# Patient Record
Sex: FEMALE | Race: WHITE | NOT HISPANIC OR LATINO
[De-identification: names, ages, dates, MRNs, and addresses within clinical notes are randomized per-mention and may not be internally consistent; named-entity substitution may affect disease eponyms.]

---

## 2017-06-30 ENCOUNTER — APPOINTMENT (OUTPATIENT)
Dept: COLORECTAL SURGERY | Facility: CLINIC | Age: 52
End: 2017-06-30

## 2017-06-30 VITALS
HEIGHT: 64 IN | WEIGHT: 172 LBS | BODY MASS INDEX: 29.37 KG/M2 | HEART RATE: 83 BPM | SYSTOLIC BLOOD PRESSURE: 122 MMHG | TEMPERATURE: 97.6 F | DIASTOLIC BLOOD PRESSURE: 74 MMHG | RESPIRATION RATE: 16 BRPM

## 2017-11-03 ENCOUNTER — MESSAGE (OUTPATIENT)
Age: 52
End: 2017-11-03

## 2017-11-03 ENCOUNTER — OTHER (OUTPATIENT)
Age: 52
End: 2017-11-03

## 2017-11-17 ENCOUNTER — APPOINTMENT (OUTPATIENT)
Dept: COLORECTAL SURGERY | Facility: CLINIC | Age: 52
End: 2017-11-17
Payer: COMMERCIAL

## 2017-11-17 VITALS
HEART RATE: 82 BPM | HEIGHT: 64 IN | SYSTOLIC BLOOD PRESSURE: 110 MMHG | BODY MASS INDEX: 29.37 KG/M2 | TEMPERATURE: 98.1 F | DIASTOLIC BLOOD PRESSURE: 72 MMHG | WEIGHT: 172 LBS

## 2017-11-17 DIAGNOSIS — K66.0 PERITONEAL ADHESIONS (POSTPROCEDURAL) (POSTINFECTION): ICD-10-CM

## 2017-11-17 DIAGNOSIS — N73.6 FEMALE PELVIC PERITONEAL ADHESIONS (POSTINFECTIVE): ICD-10-CM

## 2017-11-17 PROCEDURE — 99215 OFFICE O/P EST HI 40 MIN: CPT

## 2017-11-18 PROBLEM — K66.0 ABDOMINAL ADHESIONS: Status: ACTIVE | Noted: 2017-11-18

## 2017-11-18 PROBLEM — N73.6 PELVIC ADHESIONS: Status: ACTIVE | Noted: 2017-11-18

## 2017-11-21 ENCOUNTER — APPOINTMENT (OUTPATIENT)
Dept: CT IMAGING | Facility: CLINIC | Age: 52
End: 2017-11-21
Payer: COMMERCIAL

## 2017-11-21 ENCOUNTER — FORM ENCOUNTER (OUTPATIENT)
Age: 52
End: 2017-11-21

## 2017-11-21 ENCOUNTER — OTHER (OUTPATIENT)
Age: 52
End: 2017-11-21

## 2017-11-21 ENCOUNTER — OUTPATIENT (OUTPATIENT)
Dept: OUTPATIENT SERVICES | Facility: HOSPITAL | Age: 52
LOS: 1 days | End: 2017-11-21
Payer: COMMERCIAL

## 2017-11-21 DIAGNOSIS — Z00.8 ENCOUNTER FOR OTHER GENERAL EXAMINATION: ICD-10-CM

## 2017-11-21 PROCEDURE — 74176 CT ABD & PELVIS W/O CONTRAST: CPT

## 2017-11-22 PROCEDURE — 74176 CT ABD & PELVIS W/O CONTRAST: CPT | Mod: 26

## 2017-11-30 ENCOUNTER — APPOINTMENT (OUTPATIENT)
Dept: PLASTIC SURGERY | Facility: CLINIC | Age: 52
End: 2017-11-30
Payer: COMMERCIAL

## 2017-11-30 VITALS
HEIGHT: 64 IN | SYSTOLIC BLOOD PRESSURE: 122 MMHG | WEIGHT: 172 LBS | BODY MASS INDEX: 29.37 KG/M2 | HEART RATE: 77 BPM | OXYGEN SATURATION: 96 % | DIASTOLIC BLOOD PRESSURE: 80 MMHG

## 2017-11-30 DIAGNOSIS — L30.4 ERYTHEMA INTERTRIGO: ICD-10-CM

## 2017-11-30 PROCEDURE — 99204 OFFICE O/P NEW MOD 45 MIN: CPT

## 2017-12-03 PROBLEM — L30.4 INTERTRIGO: Status: ACTIVE | Noted: 2017-12-03

## 2018-10-12 ENCOUNTER — APPOINTMENT (OUTPATIENT)
Dept: COLORECTAL SURGERY | Facility: CLINIC | Age: 53
End: 2018-10-12
Payer: COMMERCIAL

## 2018-10-12 VITALS
HEART RATE: 90 BPM | WEIGHT: 180 LBS | BODY MASS INDEX: 30.73 KG/M2 | RESPIRATION RATE: 14 BRPM | SYSTOLIC BLOOD PRESSURE: 129 MMHG | HEIGHT: 64 IN | DIASTOLIC BLOOD PRESSURE: 90 MMHG

## 2018-10-12 DIAGNOSIS — Z09 ENCOUNTER FOR FOLLOW-UP EXAMINATION AFTER COMPLETED TREATMENT FOR CONDITIONS OTHER THAN MALIGNANT NEOPLASM: ICD-10-CM

## 2018-10-12 DIAGNOSIS — Z78.9 OTHER SPECIFIED HEALTH STATUS: ICD-10-CM

## 2018-10-12 DIAGNOSIS — K43.2 INCISIONAL HERNIA W/OUT OBSTRUCTION OR GANGRENE: ICD-10-CM

## 2018-10-12 PROCEDURE — 99215 OFFICE O/P EST HI 40 MIN: CPT

## 2018-10-12 RX ORDER — LEVOTHYROXINE SODIUM 75 UG/1
75 TABLET ORAL
Refills: 0 | Status: ACTIVE | COMMUNITY

## 2018-10-12 RX ORDER — DOCUSATE SODIUM 100 MG/1
CAPSULE ORAL
Refills: 0 | Status: ACTIVE | COMMUNITY

## 2018-10-30 ENCOUNTER — APPOINTMENT (OUTPATIENT)
Dept: PLASTIC SURGERY | Facility: CLINIC | Age: 53
End: 2018-10-30
Payer: COMMERCIAL

## 2018-10-30 VITALS
DIASTOLIC BLOOD PRESSURE: 77 MMHG | HEIGHT: 64 IN | OXYGEN SATURATION: 97 % | TEMPERATURE: 97.8 F | SYSTOLIC BLOOD PRESSURE: 128 MMHG | WEIGHT: 180 LBS | BODY MASS INDEX: 30.73 KG/M2 | HEART RATE: 80 BPM

## 2018-10-30 DIAGNOSIS — K43.9 VENTRAL HERNIA W/OUT OBSTRUCTION OR GANGRENE: ICD-10-CM

## 2018-10-30 DIAGNOSIS — E65 LOCALIZED ADIPOSITY: ICD-10-CM

## 2018-10-30 DIAGNOSIS — R10.32 LEFT LOWER QUADRANT PAIN: ICD-10-CM

## 2018-10-30 PROCEDURE — 99214 OFFICE O/P EST MOD 30 MIN: CPT

## 2019-09-02 PROBLEM — Z09 FOLLOW UP: Status: ACTIVE | Noted: 2017-11-17

## 2025-06-25 ENCOUNTER — INPATIENT (INPATIENT)
Facility: HOSPITAL | Age: 60
LOS: 0 days | Discharge: HOME | DRG: 392 | End: 2025-06-26
Attending: STUDENT IN AN ORGANIZED HEALTH CARE EDUCATION/TRAINING PROGRAM | Admitting: INTERNAL MEDICINE
Payer: COMMERCIAL

## 2025-06-25 VITALS — WEIGHT: 169.09 LBS

## 2025-06-25 DIAGNOSIS — R14.0 ABDOMINAL DISTENSION (GASEOUS): ICD-10-CM

## 2025-06-25 LAB
ALBUMIN SERPL ELPH-MCNC: 4.2 G/DL — SIGNIFICANT CHANGE UP (ref 3.3–5)
ALP SERPL-CCNC: 58 U/L — SIGNIFICANT CHANGE UP (ref 40–120)
ALT FLD-CCNC: 30 U/L — SIGNIFICANT CHANGE UP (ref 12–78)
ANION GAP SERPL CALC-SCNC: 5 MMOL/L — SIGNIFICANT CHANGE UP (ref 5–17)
APPEARANCE UR: CLEAR — SIGNIFICANT CHANGE UP
AST SERPL-CCNC: 26 U/L — SIGNIFICANT CHANGE UP (ref 15–37)
BACTERIA # UR AUTO: NEGATIVE /HPF — SIGNIFICANT CHANGE UP
BASOPHILS # BLD AUTO: 0.02 K/UL — SIGNIFICANT CHANGE UP (ref 0–0.2)
BASOPHILS NFR BLD AUTO: 0.2 % — SIGNIFICANT CHANGE UP (ref 0–2)
BILIRUB SERPL-MCNC: 0.5 MG/DL — SIGNIFICANT CHANGE UP (ref 0.2–1.2)
BILIRUB UR-MCNC: NEGATIVE — SIGNIFICANT CHANGE UP
BUN SERPL-MCNC: 17 MG/DL — SIGNIFICANT CHANGE UP (ref 7–23)
CALCIUM SERPL-MCNC: 9.6 MG/DL — SIGNIFICANT CHANGE UP (ref 8.5–10.1)
CAST: 0 /LPF — SIGNIFICANT CHANGE UP (ref 0–4)
CHLORIDE SERPL-SCNC: 107 MMOL/L — SIGNIFICANT CHANGE UP (ref 96–108)
CO2 SERPL-SCNC: 27 MMOL/L — SIGNIFICANT CHANGE UP (ref 22–31)
COLOR SPEC: YELLOW — SIGNIFICANT CHANGE UP
CREAT SERPL-MCNC: 0.87 MG/DL — SIGNIFICANT CHANGE UP (ref 0.5–1.3)
DIFF PNL FLD: ABNORMAL
EGFR: 77 ML/MIN/1.73M2 — SIGNIFICANT CHANGE UP
EGFR: 77 ML/MIN/1.73M2 — SIGNIFICANT CHANGE UP
EOSINOPHIL # BLD AUTO: 0.06 K/UL — SIGNIFICANT CHANGE UP (ref 0–0.5)
EOSINOPHIL NFR BLD AUTO: 0.6 % — SIGNIFICANT CHANGE UP (ref 0–6)
GLUCOSE SERPL-MCNC: 97 MG/DL — SIGNIFICANT CHANGE UP (ref 70–99)
GLUCOSE UR QL: NEGATIVE MG/DL — SIGNIFICANT CHANGE UP
HCT VFR BLD CALC: 43.6 % — SIGNIFICANT CHANGE UP (ref 34.5–45)
HGB BLD-MCNC: 14.3 G/DL — SIGNIFICANT CHANGE UP (ref 11.5–15.5)
IMM GRANULOCYTES # BLD AUTO: 0.04 K/UL — SIGNIFICANT CHANGE UP (ref 0–0.07)
IMM GRANULOCYTES NFR BLD AUTO: 0.4 % — SIGNIFICANT CHANGE UP (ref 0–0.9)
KETONES UR QL: NEGATIVE MG/DL — SIGNIFICANT CHANGE UP
LACTATE SERPL-SCNC: 1 MMOL/L — SIGNIFICANT CHANGE UP (ref 0.7–2)
LEUKOCYTE ESTERASE UR-ACNC: ABNORMAL
LIDOCAIN IGE QN: 45 U/L — SIGNIFICANT CHANGE UP (ref 13–75)
LYMPHOCYTES # BLD AUTO: 1 K/UL — SIGNIFICANT CHANGE UP (ref 1–3.3)
LYMPHOCYTES NFR BLD AUTO: 10 % — LOW (ref 13–44)
MCHC RBC-ENTMCNC: 29.4 PG — SIGNIFICANT CHANGE UP (ref 27–34)
MCHC RBC-ENTMCNC: 32.8 G/DL — SIGNIFICANT CHANGE UP (ref 32–36)
MCV RBC AUTO: 89.7 FL — SIGNIFICANT CHANGE UP (ref 80–100)
MONOCYTES # BLD AUTO: 0.43 K/UL — SIGNIFICANT CHANGE UP (ref 0–0.9)
MONOCYTES NFR BLD AUTO: 4.3 % — SIGNIFICANT CHANGE UP (ref 2–14)
NEUTROPHILS # BLD AUTO: 8.44 K/UL — HIGH (ref 1.8–7.4)
NEUTROPHILS NFR BLD AUTO: 84.5 % — HIGH (ref 43–77)
NITRITE UR-MCNC: NEGATIVE — SIGNIFICANT CHANGE UP
NRBC # BLD AUTO: 0 K/UL — SIGNIFICANT CHANGE UP (ref 0–0)
NRBC # FLD: 0 K/UL — SIGNIFICANT CHANGE UP (ref 0–0)
NRBC BLD AUTO-RTO: 0 /100 WBCS — SIGNIFICANT CHANGE UP (ref 0–0)
PH UR: 8 — SIGNIFICANT CHANGE UP (ref 5–8)
PLATELET # BLD AUTO: 211 K/UL — SIGNIFICANT CHANGE UP (ref 150–400)
PMV BLD: 9.7 FL — SIGNIFICANT CHANGE UP (ref 7–13)
POTASSIUM SERPL-MCNC: 3.8 MMOL/L — SIGNIFICANT CHANGE UP (ref 3.5–5.3)
POTASSIUM SERPL-SCNC: 3.8 MMOL/L — SIGNIFICANT CHANGE UP (ref 3.5–5.3)
PROT SERPL-MCNC: 8.1 GM/DL — SIGNIFICANT CHANGE UP (ref 6–8.3)
PROT UR-MCNC: NEGATIVE MG/DL — SIGNIFICANT CHANGE UP
RBC # BLD: 4.86 M/UL — SIGNIFICANT CHANGE UP (ref 3.8–5.2)
RBC # FLD: 12.4 % — SIGNIFICANT CHANGE UP (ref 10.3–14.5)
RBC CASTS # UR COMP ASSIST: 2 /HPF — SIGNIFICANT CHANGE UP (ref 0–4)
SODIUM SERPL-SCNC: 139 MMOL/L — SIGNIFICANT CHANGE UP (ref 135–145)
SP GR SPEC: 1.02 — SIGNIFICANT CHANGE UP (ref 1–1.03)
SQUAMOUS # UR AUTO: 1 /HPF — SIGNIFICANT CHANGE UP (ref 0–5)
TROPONIN I, HIGH SENSITIVITY RESULT: <3 NG/L — SIGNIFICANT CHANGE UP
UROBILINOGEN FLD QL: 0.2 MG/DL — SIGNIFICANT CHANGE UP (ref 0.2–1)
WBC # BLD: 9.99 K/UL — SIGNIFICANT CHANGE UP (ref 3.8–10.5)
WBC # FLD AUTO: 9.99 K/UL — SIGNIFICANT CHANGE UP (ref 3.8–10.5)
WBC UR QL: 1 /HPF — SIGNIFICANT CHANGE UP (ref 0–5)

## 2025-06-25 PROCEDURE — 36415 COLL VENOUS BLD VENIPUNCTURE: CPT

## 2025-06-25 PROCEDURE — 99222 1ST HOSP IP/OBS MODERATE 55: CPT

## 2025-06-25 PROCEDURE — 84443 ASSAY THYROID STIM HORMONE: CPT

## 2025-06-25 PROCEDURE — 74177 CT ABD & PELVIS W/CONTRAST: CPT | Mod: 26

## 2025-06-25 PROCEDURE — 99285 EMERGENCY DEPT VISIT HI MDM: CPT

## 2025-06-25 PROCEDURE — 74183 MRI ABD W/O CNTR FLWD CNTR: CPT

## 2025-06-25 PROCEDURE — 76705 ECHO EXAM OF ABDOMEN: CPT | Mod: 26

## 2025-06-25 PROCEDURE — 71046 X-RAY EXAM CHEST 2 VIEWS: CPT | Mod: 26

## 2025-06-25 PROCEDURE — A9579: CPT

## 2025-06-25 PROCEDURE — 80053 COMPREHEN METABOLIC PANEL: CPT

## 2025-06-25 PROCEDURE — 85027 COMPLETE CBC AUTOMATED: CPT

## 2025-06-25 RX ORDER — METOCLOPRAMIDE HCL 10 MG
10 TABLET ORAL EVERY 6 HOURS
Refills: 0 | Status: DISCONTINUED | OUTPATIENT
Start: 2025-06-25 | End: 2025-06-26

## 2025-06-25 RX ORDER — LEVOTHYROXINE SODIUM 300 MCG
100 TABLET ORAL DAILY
Refills: 0 | Status: DISCONTINUED | OUTPATIENT
Start: 2025-06-25 | End: 2025-06-26

## 2025-06-25 RX ORDER — KETOROLAC TROMETHAMINE 30 MG/ML
15 INJECTION, SOLUTION INTRAMUSCULAR; INTRAVENOUS EVERY 8 HOURS
Refills: 0 | Status: DISCONTINUED | OUTPATIENT
Start: 2025-06-25 | End: 2025-06-26

## 2025-06-25 RX ORDER — ACETAMINOPHEN 500 MG/5ML
650 LIQUID (ML) ORAL EVERY 6 HOURS
Refills: 0 | Status: DISCONTINUED | OUTPATIENT
Start: 2025-06-25 | End: 2025-06-26

## 2025-06-25 RX ORDER — KETOROLAC TROMETHAMINE 30 MG/ML
15 INJECTION, SOLUTION INTRAMUSCULAR; INTRAVENOUS ONCE
Refills: 0 | Status: DISCONTINUED | OUTPATIENT
Start: 2025-06-25 | End: 2025-06-25

## 2025-06-25 RX ORDER — METOCLOPRAMIDE HCL 10 MG
10 TABLET ORAL ONCE
Refills: 0 | Status: COMPLETED | OUTPATIENT
Start: 2025-06-25 | End: 2025-06-25

## 2025-06-25 RX ORDER — LEVOTHYROXINE SODIUM 300 MCG
1 TABLET ORAL
Refills: 0 | DISCHARGE

## 2025-06-25 RX ORDER — SODIUM CHLORIDE 9 G/1000ML
1000 INJECTION, SOLUTION INTRAVENOUS
Refills: 0 | Status: DISCONTINUED | OUTPATIENT
Start: 2025-06-25 | End: 2025-06-26

## 2025-06-25 RX ORDER — HEPARIN SODIUM 1000 [USP'U]/ML
5000 INJECTION INTRAVENOUS; SUBCUTANEOUS EVERY 12 HOURS
Refills: 0 | Status: DISCONTINUED | OUTPATIENT
Start: 2025-06-25 | End: 2025-06-26

## 2025-06-25 RX ORDER — ONDANSETRON HCL/PF 4 MG/2 ML
4 VIAL (ML) INJECTION ONCE
Refills: 0 | Status: COMPLETED | OUTPATIENT
Start: 2025-06-25 | End: 2025-06-25

## 2025-06-25 RX ADMIN — Medication 40 MILLIGRAM(S): at 20:46

## 2025-06-25 RX ADMIN — Medication 2400 MILLILITER(S): at 12:28

## 2025-06-25 RX ADMIN — SODIUM CHLORIDE 75 MILLILITER(S): 9 INJECTION, SOLUTION INTRAVENOUS at 23:30

## 2025-06-25 RX ADMIN — Medication 10 MILLIGRAM(S): at 20:46

## 2025-06-25 RX ADMIN — KETOROLAC TROMETHAMINE 15 MILLIGRAM(S): 30 INJECTION, SOLUTION INTRAMUSCULAR; INTRAVENOUS at 18:59

## 2025-06-25 RX ADMIN — Medication 4 MILLIGRAM(S): at 12:29

## 2025-06-25 RX ADMIN — Medication 4 MILLIGRAM(S): at 12:43

## 2025-06-25 NOTE — H&P ADULT - NSHPPHYSICALEXAM_GEN_ALL_CORE
Constitutional: awake, alert, appears uncomfortable but not in distress , AAOx3  Head: ATNC  Eyes: EOMI, pupils equal  EENT: ears and nose externally normal without lesions or deformities, Mouth: dry mucous membranes   Cardiac: RRR 1s2 no rmg  Resp: Lungs CTAB, no wheezing, no rhonchi   GI: abdomen is soft non tender non distended no guarding no rigidity   Neuro: awake, alert, oriented x 3, no focal deficits   Skin: Normal temp noted

## 2025-06-25 NOTE — ED STATDOCS - PROGRESS NOTE DETAILS
RENNY Ndiaye: I participated in the care of this patient. I agree with the history, physical and plan. RENNY Ndiaye: labs and imaging reviewed and discussed results with pt. Pt still with pain and abd distension. plan to admit to medicine. consult ordered for GI. Pt agrees with plan. admission form sent. Nica Deal accepted pt for admission.

## 2025-06-25 NOTE — H&P ADULT - ASSESSMENT
abdominal pain, nausea, vomiting, bloating   -ct shows largely distended stomach, gastric outlet obstruction can not be excluded  -suspect her symptoms are likely secondary gastroparesis  to wegovy  -discontinue wegovy  -start reglan IV 10 mg now and then q 6 hr prn  -start protonix 40 mg iv daily  -start ivf with d5 LR    Large midline lower abdominal ventral hernia  -no signs of obstruction  -abdomen is non tender   -recommend outpatient eval for surgery    mild cbd dilation  -lfts are normal  -no pancreatic masses seen on ct  -monitor   -outpatient follow up      Hypothyroidism  -continue home sythyroid    DIsp  possibly home tomorrow if symptoms resolved, she will likely need an EGD inhouse if no improvement , otherwise can do EGD outpatient

## 2025-06-25 NOTE — PHARMACOTHERAPY INTERVENTION NOTE - COMMENTS
Medication reconciliation completed.  Reviewed Medication list and confirmed med allergies with patient; confirmed with Dr. First Medamena.

## 2025-06-25 NOTE — ED STATDOCS - CLINICAL SUMMARY MEDICAL DECISION MAKING FREE TEXT BOX
58 y/o female with PMHx of diverticulitis s/p resection presents to the ED c/o generalized weakness, feeling faint and abdominal distension, which feels like her previous diverticulitis. Check labs, CT scan, reassess.

## 2025-06-25 NOTE — H&P ADULT - NSVTERISKREFERASSESS_GEN_ALL_CORE
Refer to the Assessment tab to view/cancel completed assessment. Eucrisa Counseling: Patient may experience a mild burning sensation during topical application. Eucrisa is not approved in children less than 2 years of age.

## 2025-06-25 NOTE — H&P ADULT - HISTORY OF PRESENT ILLNESS
this is a 60 y/o female with PMHx of abdominal hernia, diverticulitis s/p resection, hypothyroidism, presents to the ED c/o nausea, vomiting, abdominal pain, generalized weakness, purping and chest pain. symptoms started suddenly today, felt lightheaded and about to faint, symptoms  started at 7:30am today while pt was going to [facility]. Pt states she feels similar to how she felt when she had diverticulitis. Pt states she feels the need to have a BM but is unable to. Last BM was yesterday. Endorsed abdominal pain with bloating, weakness, nausea, lightheadedness, burping. Denies fever, chills, urinary urgency, or frequency. patient is on weekly injections of wegovy, ct abdomen and pelvis showed Largelydistended stomach. Gastric outlet obstruction cannot be excluded. Large midline lower abdominal ventral hernia measuring 16.6 x 4.6 cm, increased from prior measurement of 10.6 x 4.5 cm. This hernia now contains nonobstructed transverse colon and small bowel.  Common bile duct and main pancreatic duct are mildly dilated.  No pancreatic mass lesion is identified at CT, US abdomen showed Mild common bile duct dilatation

## 2025-06-25 NOTE — CONSULT NOTE ADULT - SUBJECTIVE AND OBJECTIVE BOX
HPI:  60 y/o female with PMHx of abdominal hernia, diverticulitis s/p resection, hypothyroidism, presents to the ED c/o nausea, vomiting, abdominal pain, generalized weakness, eructation and chest pain. Symptom began suddenly this morning. She had taken her Wegovy the night prior. Had bowel movement yesterday but unable to move today but passing flatus. Denies fever, chills, urinary urgency, or frequency.    Currently hemodynamically stable and afebrile. No elevations in LFTs. CT showing distended stomach, large known ventral hernia with non obstruccted bowel, mildly dilated BCCD and main pancreatic duct.       (25 Jun 2025 20:21)      PAST MEDICAL & SURGICAL HISTORY:      Home Medications:  Synthroid 100 mcg (0.1 mg) oral tablet: 1 tab(s) orally once a day (25 Jun 2025 18:03)  Wegovy (1.7 mg dose) subcutaneous solution: 1.7 milligram(s) subcutaneously every 3 weeks (25 Jun 2025 18:03)      MEDICATIONS  (STANDING):  dextrose 5% + lactated ringers. 1000 milliLiter(s) (75 mL/Hr) IV Continuous <Continuous>  heparin   Injectable 5000 Unit(s) SubCutaneous every 12 hours  levothyroxine 100 MICROGram(s) Oral daily  morphine  - Injectable 4 milliGRAM(s) IV Push once  pantoprazole  Injectable 40 milliGRAM(s) IV Push daily    MEDICATIONS  (PRN):  acetaminophen     Tablet .. 650 milliGRAM(s) Oral every 6 hours PRN Temp greater or equal to 38C (100.4F), Mild Pain (1 - 3)  ketorolac   Injectable 15 milliGRAM(s) IV Push every 8 hours PRN Severe Pain (7 - 10)  metoclopramide Injectable 10 milliGRAM(s) IV Push every 6 hours PRN nausea or vomiting      Allergies    No Known Allergies    Intolerances        SOCIAL HISTORY:    FAMILY HISTORY:      ROS  As above  Otherwise unremarkable    Vital Signs Last 24 Hrs  T(C): 37 (25 Jun 2025 20:48), Max: 37.1 (25 Jun 2025 19:24)  T(F): 98.6 (25 Jun 2025 20:48), Max: 98.8 (25 Jun 2025 19:24)  HR: 83 (25 Jun 2025 20:48) (74 - 83)  BP: 128/72 (25 Jun 2025 20:48) (119/64 - 129/76)  BP(mean): 83 (25 Jun 2025 20:48) (76 - 91)  RR: 18 (25 Jun 2025 20:48) (18 - 18)  SpO2: 97% (25 Jun 2025 20:48) (97% - 98%)    Parameters below as of 25 Jun 2025 20:48  Patient On (Oxygen Delivery Method): room air        Constitutional: Mild distress  Respiratory: CTAB  Cardiovascular: S1 and S2, RRR  Gastrointestinal: BS+, soft, NT/ND  Extremities: No peripheral edema  Psychiatric: Normal mood, normal affect  Skin: No rashes    LABS:                        14.3   9.99  )-----------( 211      ( 25 Jun 2025 12:03 )             43.6     06-25    139  |  107  |  17  ----------------------------<  97  3.8   |  27  |  0.87    Ca    9.6      25 Jun 2025 12:03    TPro  8.1  /  Alb  4.2  /  TBili  0.5  /  DBili  x   /  AST  26  /  ALT  30  /  AlkPhos  58  06-25      LIVER FUNCTIONS - ( 25 Jun 2025 12:03 )  Alb: 4.2 g/dL / Pro: 8.1 gm/dL / ALK PHOS: 58 U/L / ALT: 30 U/L / AST: 26 U/L / GGT: x             RADIOLOGY & ADDITIONAL STUDIES:    ACC: [id number] EXAM:  CT ABDOMEN AND PELVIS IC   ORDERED BY: FAVIAN FRANCO     PROCEDURE DATE:  06/25/2025          INTERPRETATION:  CLINICAL INFORMATION: Abdominal distention    COMPARISON: 11/21/2017    CONTRAST/COMPLICATIONS:  IV Contrast: Omnipaque 350  90 cc administered   10 cc discarded  Oral Contrast: NONE.    PROCEDURE:  CT of the Abdomen and Pelvis was performed.  Sagittal and coronal reformats were performed.    FINDINGS:  LOWER CHEST: Small bilateral atelectasis. Small calcifiedgranuloma in   the left lower lobe.    LIVER: Small hypodense lesion in the right hepatic lobe, too small to   characterize.  BILE DUCTS: Mildly dilated common bile duct.  GALLBLADDER: Within normal limits.  SPLEEN: Within normal limits.  PANCREAS: The main pancreatic duct is slightly dilated. No CT evidence   for a pancreatic mass lesion  ADRENALS: Within normal limits.  KIDNEYS/URETERS: Within normal limits except for a few small hypodense   lesions in the kidneys bilaterally, too small to characterize.    BLADDER: Within normal limits.  REPRODUCTIVE ORGANS: The uterus and adnexa appear grossly unremarkable.    BOWEL: No small bowel or large bowel obstruction. Appendix within normal   limits. Largely distended stomach. Colonic diverticulosis without   evidence for diverticulitis. Colorectal anastomosis.  PERITONEUM/RETROPERITONEUM: Within normal limits.  VESSELS: Mild vascular calcifications.  LYMPH NODES: No lymphadenopathy.  ABDOMINAL WALL: Small fat-containing umbilical hernia. Largemidline   lower abdominal ventral hernia measuring 16.6 x 4.6 cm, increased from   prior measurement of 10.6 x 4.5 cm. This hernia now contains   nonobstructed transverse colon and small bowel.  BONES: Degenerative spondylosis    IMPRESSION:  Largelydistended stomach. Gastric outlet obstruction cannot be excluded.   If clinically indicated, EGD may be pursued for further evaluation.    Large midline lower abdominal ventral hernia measuring 16.6 x 4.6 cm,   increased from prior measurement of 10.6 x 4.5 cm. This hernia now   contains nonobstructed transverse colon and small bowel.    Common bile duct and main pancreatic duct are mildly dilated. No   pancreatic mass lesion is identified at CT. If clinically indicated, MRCP   without and with IV contrast is suggested for further evaluation.    --- End of Report ---            ABBEY TUCKER MD; Attending Radiologist  This document has been electronically signed. Jun 25 2025  2:06PM

## 2025-06-25 NOTE — CONSULT NOTE ADULT - ASSESSMENT
1. Nausea, vomiting, dizziness, general malaise. Patient had taken Wegovy night prior which may explain her distended stomach due to slowed gastric emptying.   2. Dilated CBD and PD. No elevated liver tests but will evaluate on MRCP    Recommendation  1. NPO at this time.   2. Follow up MRCP  3. If having vomiting and worsening abdominal distension, consider NGT  4. PPI 40 mg IVP

## 2025-06-25 NOTE — ED STATDOCS - ATTENDING APP SHARED VISIT CONTRIBUTION OF CARE
I personally saw the patient with the LACEY, and completed the key components of the history and physical exam. I then discussed the management plan with the LACEY.

## 2025-06-25 NOTE — ED STATDOCS - OBJECTIVE STATEMENT
60 y/o female with PMHx of hernia, diverticulitis s/p resection presents to the ED c/o generalized weakness, feeling faint that starting at 7:30am today while pt was going to [facility]. Pt states she feels similar to how she felt when she had diverticulitis. Pt states she feels the need to have a BM but is unable to. Last BM was yesterday. Endorses abdominal pain with bloating, weakness, nausea, lightheadedness, burping. Denies fever, difficulty urinating.

## 2025-06-26 ENCOUNTER — TRANSCRIPTION ENCOUNTER (OUTPATIENT)
Age: 60
End: 2025-06-26

## 2025-06-26 VITALS
HEART RATE: 66 BPM | RESPIRATION RATE: 17 BRPM | SYSTOLIC BLOOD PRESSURE: 129 MMHG | TEMPERATURE: 98 F | OXYGEN SATURATION: 99 % | DIASTOLIC BLOOD PRESSURE: 71 MMHG

## 2025-06-26 LAB
ALBUMIN SERPL ELPH-MCNC: 3.6 G/DL — SIGNIFICANT CHANGE UP (ref 3.3–5)
ALP SERPL-CCNC: 44 U/L — SIGNIFICANT CHANGE UP (ref 40–120)
ALT FLD-CCNC: 23 U/L — SIGNIFICANT CHANGE UP (ref 12–78)
ANION GAP SERPL CALC-SCNC: 6 MMOL/L — SIGNIFICANT CHANGE UP (ref 5–17)
AST SERPL-CCNC: 10 U/L — LOW (ref 15–37)
BILIRUB SERPL-MCNC: 0.5 MG/DL — SIGNIFICANT CHANGE UP (ref 0.2–1.2)
BUN SERPL-MCNC: 17 MG/DL — SIGNIFICANT CHANGE UP (ref 7–23)
CALCIUM SERPL-MCNC: 8.9 MG/DL — SIGNIFICANT CHANGE UP (ref 8.5–10.1)
CHLORIDE SERPL-SCNC: 111 MMOL/L — HIGH (ref 96–108)
CO2 SERPL-SCNC: 26 MMOL/L — SIGNIFICANT CHANGE UP (ref 22–31)
CREAT SERPL-MCNC: 0.82 MG/DL — SIGNIFICANT CHANGE UP (ref 0.5–1.3)
EGFR: 82 ML/MIN/1.73M2 — SIGNIFICANT CHANGE UP
EGFR: 82 ML/MIN/1.73M2 — SIGNIFICANT CHANGE UP
GLUCOSE SERPL-MCNC: 91 MG/DL — SIGNIFICANT CHANGE UP (ref 70–99)
HCT VFR BLD CALC: 41.9 % — SIGNIFICANT CHANGE UP (ref 34.5–45)
HGB BLD-MCNC: 13.3 G/DL — SIGNIFICANT CHANGE UP (ref 11.5–15.5)
MCHC RBC-ENTMCNC: 29.2 PG — SIGNIFICANT CHANGE UP (ref 27–34)
MCHC RBC-ENTMCNC: 31.7 G/DL — LOW (ref 32–36)
MCV RBC AUTO: 92.1 FL — SIGNIFICANT CHANGE UP (ref 80–100)
NRBC # BLD AUTO: 0 K/UL — SIGNIFICANT CHANGE UP (ref 0–0)
NRBC # FLD: 0 K/UL — SIGNIFICANT CHANGE UP (ref 0–0)
NRBC BLD AUTO-RTO: 0 /100 WBCS — SIGNIFICANT CHANGE UP (ref 0–0)
PLATELET # BLD AUTO: 195 K/UL — SIGNIFICANT CHANGE UP (ref 150–400)
PMV BLD: 9.6 FL — SIGNIFICANT CHANGE UP (ref 7–13)
POTASSIUM SERPL-MCNC: 3.7 MMOL/L — SIGNIFICANT CHANGE UP (ref 3.5–5.3)
POTASSIUM SERPL-SCNC: 3.7 MMOL/L — SIGNIFICANT CHANGE UP (ref 3.5–5.3)
PROT SERPL-MCNC: 6.7 GM/DL — SIGNIFICANT CHANGE UP (ref 6–8.3)
RBC # BLD: 4.55 M/UL — SIGNIFICANT CHANGE UP (ref 3.8–5.2)
RBC # FLD: 12.6 % — SIGNIFICANT CHANGE UP (ref 10.3–14.5)
SODIUM SERPL-SCNC: 143 MMOL/L — SIGNIFICANT CHANGE UP (ref 135–145)
TSH SERPL-MCNC: 0.9 UU/ML — SIGNIFICANT CHANGE UP (ref 0.34–4.82)
WBC # BLD: 5.36 K/UL — SIGNIFICANT CHANGE UP (ref 3.8–10.5)
WBC # FLD AUTO: 5.36 K/UL — SIGNIFICANT CHANGE UP (ref 3.8–10.5)

## 2025-06-26 PROCEDURE — 99239 HOSP IP/OBS DSCHRG MGMT >30: CPT

## 2025-06-26 PROCEDURE — 74183 MRI ABD W/O CNTR FLWD CNTR: CPT | Mod: 26

## 2025-06-26 RX ORDER — SEMAGLUTIDE 1 MG/.5ML
1.7 INJECTION, SOLUTION SUBCUTANEOUS
Refills: 0 | DISCHARGE

## 2025-06-26 RX ORDER — METOCLOPRAMIDE HCL 10 MG
1 TABLET ORAL
Qty: 28 | Refills: 0
Start: 2025-06-26 | End: 2025-07-02

## 2025-06-26 RX ADMIN — Medication 100 MICROGRAM(S): at 06:09

## 2025-06-26 RX ADMIN — Medication 40 MILLIGRAM(S): at 09:26

## 2025-06-26 RX ADMIN — SODIUM CHLORIDE 75 MILLILITER(S): 9 INJECTION, SOLUTION INTRAVENOUS at 13:10

## 2025-06-26 NOTE — DISCHARGE NOTE PROVIDER - NSDCMRMEDTOKEN_GEN_ALL_CORE_FT
Reglan 5 mg oral tablet: 1 tab(s) orally every 6 hours as needed for  nausea  Synthroid 100 mcg (0.1 mg) oral tablet: 1 tab(s) orally once a day

## 2025-06-26 NOTE — DISCHARGE NOTE NURSING/CASE MANAGEMENT/SOCIAL WORK - PATIENT PORTAL LINK FT
You can access the FollowMyHealth Patient Portal offered by [facility] by registering at the following website: [email or web address]. By joining [facility]’s FollowMyHealth portal, you will also be able to view your health information using other applications (apps) compatible with our system.

## 2025-06-26 NOTE — DISCHARGE NOTE PROVIDER - HOSPITAL COURSE
#Discharge: do not delete  60 y/o female with PMHx of abdominal hernia, diverticulitis s/p resection, hypothyroidism, presents to the ED c/o nausea, vomiting, abdominal pain, generalized weakness, burping and chest pain found to have distended stomach likely due to wegovy, got mrcp due to her borderline cbd dilation -- symptoms all resolved will follow up outpatient.   Problem List/Main Diagnoses (system-based):   abdominal pain, nausea, vomiting, bloating   -ct shows largely distended stomach, gastric outlet obstruction can not be excluded  -suspect her symptoms are likely secondary gastroparesis  to wegovy  -discontinue wegovy f/u with pcp       Large midline lower abdominal ventral hernia  -no signs of obstruction  -abdomen is non tender   -recommend outpatient eval for surgery    mild cbd dilation  -lfts are normal  -no pancreatic masses seen on ct  -mrcp done will f/u results outpatient    Hypothyroidism  -continue home sythyroid    Inpatient treatment course: as above  New medications: none

## 2025-06-26 NOTE — DISCHARGE NOTE NURSING/CASE MANAGEMENT/SOCIAL WORK - FINANCIAL ASSISTANCE
[facility] provides services at a reduced cost to those who are determined to be eligible through [facility]’s financial assistance program. Information regarding [facility]’s financial assistance program can be found by going to [email or web address] or by calling [phone].

## 2025-06-26 NOTE — PROGRESS NOTE ADULT - ASSESSMENT
1. Nausea, vomiting, dizziness, general malaise. Patient had taken Wegovy night prior which may explain her distended stomach due to slowed gastric emptying.   2. Dilated CBD and PD. No elevated liver tests. MRCP unremarkable with minimal dilation and no PD dilatiton.     Recommendation  1. Low fat diett  2. No urgent follow up in office.   3. No contraindication to discharge

## 2025-06-26 NOTE — DISCHARGE NOTE PROVIDER - NSDCCPCAREPLAN_GEN_ALL_CORE_FT
PRINCIPAL DISCHARGE DIAGNOSIS  Diagnosis: Abdominal distention  Assessment and Plan of Treatment: You presetned with abdominal distention in the hospital likely due to your wegovy - which caused your symptoms of distention - you got an MRI of your bile ducts as they were borderline dilated and we will follow up the reuslts with you  Please discuss discontinuing your wegovy with your primary care doctor      SECONDARY DISCHARGE DIAGNOSES  Diagnosis: Abdominal pain  Assessment and Plan of Treatment:

## 2025-06-26 NOTE — PROGRESS NOTE ADULT - SUBJECTIVE AND OBJECTIVE BOX
Patient is a 59y old  Female who presents with a chief complaint of nausea, abdominal pain (26 Jun 2025 15:01)      Subective: Seen and examined at bedside. Symptoms of abdominal pain and nausea have resolved.       PAST MEDICAL & SURGICAL HISTORY:      MEDICATIONS  (STANDING):  dextrose 5% + lactated ringers. 1000 milliLiter(s) (75 mL/Hr) IV Continuous <Continuous>  heparin   Injectable 5000 Unit(s) SubCutaneous every 12 hours  levothyroxine 100 MICROGram(s) Oral daily  morphine  - Injectable 4 milliGRAM(s) IV Push once  pantoprazole  Injectable 40 milliGRAM(s) IV Push daily    MEDICATIONS  (PRN):  acetaminophen     Tablet .. 650 milliGRAM(s) Oral every 6 hours PRN Temp greater or equal to 38C (100.4F), Mild Pain (1 - 3)  ketorolac   Injectable 15 milliGRAM(s) IV Push every 8 hours PRN Severe Pain (7 - 10)  metoclopramide Injectable 10 milliGRAM(s) IV Push every 6 hours PRN nausea or vomiting      REVIEW OF SYSTEMS:    RESPIRATORY: No shortness of breath  CARDIOVASCULAR: No chest pain  All other review of systems is negative unless indicated above.    Vital Signs Last 24 Hrs  T(C): 36.8 (26 Jun 2025 08:10), Max: 37.1 (25 Jun 2025 19:24)  T(F): 98.3 (26 Jun 2025 08:10), Max: 98.8 (25 Jun 2025 19:24)  HR: 66 (26 Jun 2025 08:10) (66 - 83)  BP: 129/71 (26 Jun 2025 08:10) (108/59 - 129/71)  BP(mean): 83 (25 Jun 2025 20:48) (83 - 83)  RR: 17 (26 Jun 2025 08:10) (17 - 18)  SpO2: 99% (26 Jun 2025 08:10) (96% - 99%)    Parameters below as of 26 Jun 2025 08:10  Patient On (Oxygen Delivery Method): room air        PHYSICAL EXAM:    Constitutional: NAD, well-developed  Respiratory: CTAB  Cardiovascular: S1 and S2, RRR  Gastrointestinal: BS+, soft, NT/ND  Extremities: No peripheral edema  Psychiatric: Normal mood, normal affect    LABS:                        13.3   5.36  )-----------( 195      ( 26 Jun 2025 08:13 )             41.9     06-26    143  |  111[H]  |  17  ----------------------------<  91  3.7   |  26  |  0.82    Ca    8.9      26 Jun 2025 08:13    TPro  6.7  /  Alb  3.6  /  TBili  0.5  /  DBili  x   /  AST  10[L]  /  ALT  23  /  AlkPhos  44  06-26      LIVER FUNCTIONS - ( 26 Jun 2025 08:13 )  Alb: 3.6 g/dL / Pro: 6.7 gm/dL / ALK PHOS: 44 U/L / ALT: 23 U/L / AST: 10 U/L / GGT: x             RADIOLOGY & ADDITIONAL STUDIES:    ACC: [id number] EXAM:  MR MRCP WAW IC   ORDERED BY: SANTIAGO CASTRO     PROCEDURE DATE:  06/26/2025          INTERPRETATION:  CLINICAL INFORMATION: Dilated CBD on CT scan.    COMPARISON: CT scan 6/25/2025    CONTRAST/COMPLICATIONS:  IV Contrast: Gadavist  7.5 cc administered   0 cc discarded  Oral Contrast: NONE.    PROCEDURE:  MRI of the abdomen was performed.  MRCP was performed.    FINDINGS:  LOWER CHEST: Within normal limits.    LIVER: Small cyst in the tip of the right hepatic lobe.  BILE DUCTS: Mild dilatation of the extrahepatic bile duct, which measures   8 mm. No choledocholithiasis. CBD and pancreatic duct taper normally at   the ampulla.  GALLBLADDER: Biliary sludge.  SPLEEN: Within normal limits.  PANCREAS: Within normal limits. No evidence of pancreatic mass, atrophy,   or ductal dilatation.  ADRENALS: Within normal limits.  KIDNEYS/URETERS: Bilateral renal cysts.    VISUALIZED PORTIONS:  BOWEL: Within normal limits. Is normal.  PERITONEUM: No ascites.  VESSELS: Within normal limits.  RETROPERITONEUM/LYMPH NODES: No lymphadenopathy.  ABDOMINAL WALL: Fat-containing supraumbilical ventral hernia.  BONES: Degenerative changes.    IMPRESSION:  Mild dilatation of the CBD, which measures up to 8 mm.  Normal tapering of the CBD and pancreatic duct. No evidence of pancreatic   mass.        --- End of Report ---            LINDA PEREA MD; Attending Radiologist  This document has been electronically signed. Jun 26 2025  3:38PM

## 2025-06-26 NOTE — DISCHARGE NOTE PROVIDER - PROVIDER TOKENS
PROVIDER:[TOKEN:[[id number]]],PROVIDER:[TOKEN:[[id number]]],PROVIDER:[TOKEN:[[id number]]]

## 2025-06-27 LAB
CULTURE RESULTS: SIGNIFICANT CHANGE UP
SPECIMEN SOURCE: SIGNIFICANT CHANGE UP

## 2025-07-02 DIAGNOSIS — E03.9 HYPOTHYROIDISM, UNSPECIFIED: ICD-10-CM

## 2025-07-02 DIAGNOSIS — R14.0 ABDOMINAL DISTENSION (GASEOUS): ICD-10-CM

## 2025-07-02 DIAGNOSIS — K43.9 VENTRAL HERNIA WITHOUT OBSTRUCTION OR GANGRENE: ICD-10-CM

## 2025-07-02 DIAGNOSIS — K83.8 OTHER SPECIFIED DISEASES OF BILIARY TRACT: ICD-10-CM

## 2025-09-25 PROBLEM — K43.0 INCISIONAL HERNIA WITH OBSTRUCTION BUT NO GANGRENE: Status: ACTIVE | Noted: 2025-09-25
